# Patient Record
Sex: FEMALE | Race: WHITE | NOT HISPANIC OR LATINO | Employment: FULL TIME | URBAN - METROPOLITAN AREA
[De-identification: names, ages, dates, MRNs, and addresses within clinical notes are randomized per-mention and may not be internally consistent; named-entity substitution may affect disease eponyms.]

---

## 2018-10-17 ENCOUNTER — APPOINTMENT (OUTPATIENT)
Dept: CT IMAGING | Facility: HOSPITAL | Age: 48
End: 2018-10-17

## 2018-10-17 ENCOUNTER — APPOINTMENT (OUTPATIENT)
Dept: GENERAL RADIOLOGY | Facility: HOSPITAL | Age: 48
End: 2018-10-17

## 2018-10-17 ENCOUNTER — HOSPITAL ENCOUNTER (EMERGENCY)
Facility: HOSPITAL | Age: 48
Discharge: HOME | End: 2018-10-17
Attending: EMERGENCY MEDICINE | Admitting: EMERGENCY MEDICINE

## 2018-10-17 VITALS
SYSTOLIC BLOOD PRESSURE: 139 MMHG | HEART RATE: 96 BPM | HEIGHT: 62 IN | DIASTOLIC BLOOD PRESSURE: 84 MMHG | TEMPERATURE: 97.9 F | OXYGEN SATURATION: 99 % | BODY MASS INDEX: 33.73 KG/M2 | RESPIRATION RATE: 16 BRPM | WEIGHT: 183.3 LBS

## 2018-10-17 DIAGNOSIS — R55 VASOVAGAL EPISODE: Primary | ICD-10-CM

## 2018-10-17 LAB
ALBUMIN SERPL-MCNC: 4.2 G/DL (ref 3.5–5.2)
ALBUMIN/GLOB SERPL: 1.4 G/DL
ALP SERPL-CCNC: 85 U/L (ref 39–117)
ALT SERPL W P-5'-P-CCNC: 21 U/L (ref 1–33)
ANION GAP SERPL CALCULATED.3IONS-SCNC: 12.2 MMOL/L
AST SERPL-CCNC: 20 U/L (ref 1–32)
BACTERIA UR QL AUTO: NORMAL /HPF
BASOPHILS # BLD AUTO: 0.03 10*3/MM3 (ref 0–0.2)
BASOPHILS NFR BLD AUTO: 0.3 % (ref 0–1.5)
BILIRUB SERPL-MCNC: 0.7 MG/DL (ref 0.1–1.2)
BILIRUB UR QL STRIP: NEGATIVE
BUN BLD-MCNC: 13 MG/DL (ref 6–20)
BUN/CREAT SERPL: 14.3 (ref 7–25)
CALCIUM SPEC-SCNC: 8.9 MG/DL (ref 8.6–10.5)
CHLORIDE SERPL-SCNC: 101 MMOL/L (ref 98–107)
CLARITY UR: CLEAR
CO2 SERPL-SCNC: 23.8 MMOL/L (ref 22–29)
COLOR UR: YELLOW
CREAT BLD-MCNC: 0.91 MG/DL (ref 0.57–1)
DEPRECATED RDW RBC AUTO: 44.8 FL (ref 37–54)
EOSINOPHIL # BLD AUTO: 0.12 10*3/MM3 (ref 0–0.7)
EOSINOPHIL NFR BLD AUTO: 1.3 % (ref 0.3–6.2)
ERYTHROCYTE [DISTWIDTH] IN BLOOD BY AUTOMATED COUNT: 13.6 % (ref 11.7–13)
GFR SERPL CREATININE-BSD FRML MDRD: 66 ML/MIN/1.73
GLOBULIN UR ELPH-MCNC: 2.9 GM/DL
GLUCOSE BLD-MCNC: 94 MG/DL (ref 65–99)
GLUCOSE UR STRIP-MCNC: NEGATIVE MG/DL
HCG SERPL QL: NEGATIVE
HCT VFR BLD AUTO: 43 % (ref 35.6–45.5)
HGB BLD-MCNC: 14 G/DL (ref 11.9–15.5)
HGB UR QL STRIP.AUTO: NEGATIVE
HOLD SPECIMEN: NORMAL
HOLD SPECIMEN: NORMAL
HYALINE CASTS UR QL AUTO: NORMAL /LPF
IMM GRANULOCYTES # BLD: 0.14 10*3/MM3 (ref 0–0.03)
IMM GRANULOCYTES NFR BLD: 1.5 % (ref 0–0.5)
KETONES UR QL STRIP: NEGATIVE
LEUKOCYTE ESTERASE UR QL STRIP.AUTO: ABNORMAL
LYMPHOCYTES # BLD AUTO: 2.7 10*3/MM3 (ref 0.9–4.8)
LYMPHOCYTES NFR BLD AUTO: 28.2 % (ref 19.6–45.3)
MCH RBC QN AUTO: 29.3 PG (ref 26.9–32)
MCHC RBC AUTO-ENTMCNC: 32.6 G/DL (ref 32.4–36.3)
MCV RBC AUTO: 90 FL (ref 80.5–98.2)
MONOCYTES # BLD AUTO: 0.49 10*3/MM3 (ref 0.2–1.2)
MONOCYTES NFR BLD AUTO: 5.1 % (ref 5–12)
NEUTROPHILS # BLD AUTO: 6.25 10*3/MM3 (ref 1.9–8.1)
NEUTROPHILS NFR BLD AUTO: 65.1 % (ref 42.7–76)
NITRITE UR QL STRIP: NEGATIVE
NRBC BLD MANUAL-RTO: 0 /100 WBC (ref 0–0)
PH UR STRIP.AUTO: 6 [PH] (ref 5–8)
PLATELET # BLD AUTO: 318 10*3/MM3 (ref 140–500)
PMV BLD AUTO: 9.6 FL (ref 6–12)
POTASSIUM BLD-SCNC: 4.1 MMOL/L (ref 3.5–5.2)
PROT SERPL-MCNC: 7.1 G/DL (ref 6–8.5)
PROT UR QL STRIP: NEGATIVE
RBC # BLD AUTO: 4.78 10*6/MM3 (ref 3.9–5.2)
RBC # UR: NORMAL /HPF
REF LAB TEST METHOD: NORMAL
SODIUM BLD-SCNC: 137 MMOL/L (ref 136–145)
SP GR UR STRIP: 1.01 (ref 1–1.03)
SQUAMOUS #/AREA URNS HPF: NORMAL /HPF
TROPONIN T SERPL-MCNC: <0.01 NG/ML (ref 0–0.03)
UROBILINOGEN UR QL STRIP: ABNORMAL
WBC NRBC COR # BLD: 9.59 10*3/MM3 (ref 4.5–10.7)
WBC UR QL AUTO: NORMAL /HPF
WHOLE BLOOD HOLD SPECIMEN: NORMAL
WHOLE BLOOD HOLD SPECIMEN: NORMAL

## 2018-10-17 PROCEDURE — 80053 COMPREHEN METABOLIC PANEL: CPT | Performed by: PHYSICIAN ASSISTANT

## 2018-10-17 PROCEDURE — 84703 CHORIONIC GONADOTROPIN ASSAY: CPT | Performed by: PHYSICIAN ASSISTANT

## 2018-10-17 PROCEDURE — 72110 X-RAY EXAM L-2 SPINE 4/>VWS: CPT

## 2018-10-17 PROCEDURE — 93010 ELECTROCARDIOGRAM REPORT: CPT | Performed by: INTERNAL MEDICINE

## 2018-10-17 PROCEDURE — 85025 COMPLETE CBC W/AUTO DIFF WBC: CPT | Performed by: PHYSICIAN ASSISTANT

## 2018-10-17 PROCEDURE — 81001 URINALYSIS AUTO W/SCOPE: CPT | Performed by: PHYSICIAN ASSISTANT

## 2018-10-17 PROCEDURE — 84484 ASSAY OF TROPONIN QUANT: CPT | Performed by: PHYSICIAN ASSISTANT

## 2018-10-17 PROCEDURE — P9612 CATHETERIZE FOR URINE SPEC: HCPCS

## 2018-10-17 PROCEDURE — 70450 CT HEAD/BRAIN W/O DYE: CPT

## 2018-10-17 PROCEDURE — 99284 EMERGENCY DEPT VISIT MOD MDM: CPT

## 2018-10-17 PROCEDURE — 93005 ELECTROCARDIOGRAM TRACING: CPT | Performed by: PHYSICIAN ASSISTANT

## 2018-10-17 RX ORDER — SODIUM CHLORIDE 0.9 % (FLUSH) 0.9 %
10 SYRINGE (ML) INJECTION AS NEEDED
Status: DISCONTINUED | OUTPATIENT
Start: 2018-10-17 | End: 2018-10-17 | Stop reason: HOSPADM

## 2018-10-17 NOTE — ED TRIAGE NOTES
Pt states has been stressed out at work recently, pt states today she was walking down [address] at work and became nauseous and had witnessed syncopal episode lasting few seconds. pt c/o headache and low back pain.  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

## 2018-10-17 NOTE — ED PROVIDER NOTES
" EMERGENCY DEPARTMENT ENCOUNTER    CHIEF COMPLAINT  Chief Complaint: Near syncope  History given by: Pt  History limited by: Nothing  Room Number:   PMD: Ryle, Larry Madison, MD      HPI:  Pt is a 48 y.o. female who presents complaining of near syncopal episode while she was at work, when the pt was aware but felt slower than usual. 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 The pt confirms headache, vision changes. The pt states that she has had a sinus infection for 1 month and is under a lot of stress at work.     The pt will have surgery on her R ear in 2 days due to tumor/mass in her ear.    Duration:  PTA  Onset: Gradual  Timing: Constant  Location: Head  Quality: Dizziness, \"feeling like she is swimming\"  Intensity/Severity: Moderate  Progression: Improving slightly  Associated Symptoms: Headache, dizziness, vision changes  Aggravating Factors: Unknown  Alleviating Factors: Unknown  Treatment before arrival: None    PAST MEDICAL HISTORY  Active Ambulatory Problems     Diagnosis Date Noted   • No Active Ambulatory Problems     Resolved Ambulatory Problems     Diagnosis Date Noted   • No Resolved Ambulatory Problems     Past Medical History:   Diagnosis Date   • Hypothyroid        PAST SURGICAL HISTORY  Past Surgical History:   Procedure Laterality Date   • BREAST AUGMENTATION     •  SECTION     • CHOLECYSTECTOMY     • DILATATION AND CURETTAGE         FAMILY HISTORY  History reviewed. No pertinent family history.    SOCIAL HISTORY  Social History     Social History   • Marital status:      Spouse name: N/A   • Number of children: N/A   • Years of education: N/A     Occupational History   • Not on file.     Social History Main Topics   • Smoking status: Never Smoker   • Smokeless tobacco: Not on file   • Alcohol use No   • Drug use: No   • Sexual activity: Not on file     Other Topics Concern   • Not on file     Social History " Narrative   • No narrative on file       ALLERGIES  Gadolinium derivatives    REVIEW OF SYSTEMS  Review of Systems   Constitutional: Negative for fever.   HENT: Negative for sore throat.    Eyes: Positive for visual disturbance.   Respiratory: Negative for cough and shortness of breath.    Cardiovascular: Negative for chest pain.   Gastrointestinal: Negative for abdominal pain, diarrhea and vomiting.   Genitourinary: Negative for dysuria.   Musculoskeletal: Negative for neck pain.   Skin: Negative for rash.   Allergic/Immunologic: Negative.    Neurological: Positive for dizziness, syncope (Near) and headaches. Negative for weakness and numbness.   Hematological: Negative.    Psychiatric/Behavioral: Negative.    All other systems reviewed and are negative.      PHYSICAL EXAM  ED Triage Vitals   Temp Heart Rate Resp BP SpO2   10/17/18 1310 10/17/18 1309 10/17/18 1309 10/17/18 1309 10/17/18 1309   97.9 °F (36.6 °C) 109 18 130/93 99 %      Temp src Heart Rate Source Patient Position BP Location FiO2 (%)   10/17/18 1310 10/17/18 1549 10/17/18 1549 10/17/18 1549 --   Tympanic Monitor Lying Right arm        Physical Exam   Constitutional: She is oriented to person, place, and time. No distress.   HENT:   Head: Normocephalic and atraumatic.   Eyes: Pupils are equal, round, and reactive to light. EOM are normal.   Neck: Normal range of motion. Neck supple.   Cardiovascular: Normal rate, regular rhythm and normal heart sounds.    Pulmonary/Chest: Effort normal and breath sounds normal. No respiratory distress.   Abdominal: Soft. There is no tenderness. There is no rebound and no guarding.   Musculoskeletal: Normal range of motion. She exhibits no edema.   Neurological: She is alert and oriented to person, place, and time. She has normal sensation and normal strength.   Skin: Skin is warm and dry. No rash noted.   Psychiatric: Mood and affect normal.   Nursing note and vitals reviewed.      LAB RESULTS  Lab Results (last 24  hours)     Procedure Component Value Units Date/Time    Comprehensive Metabolic Panel [[id number]] Collected:  10/17/18 1329    Specimen:  Blood Updated:  10/17/18 1547     Glucose 94 mg/dL      BUN 13 mg/dL      Creatinine 0.91 mg/dL      Sodium 137 mmol/L      Potassium 4.1 mmol/L      Chloride 101 mmol/L      CO2 23.8 mmol/L      Calcium 8.9 mg/dL      Total Protein 7.1 g/dL      Albumin 4.20 g/dL      ALT (SGPT) 21 U/L      AST (SGOT) 20 U/L      Alkaline Phosphatase 85 U/L      Total Bilirubin 0.7 mg/dL      eGFR Non African Amer 66 mL/min/1.73      Globulin 2.9 gm/dL      A/G Ratio 1.4 g/dL      BUN/Creatinine Ratio 14.3     Anion Gap 12.2 mmol/L     hCG, Serum, Qualitative [[id number]]  (Normal) Collected:  10/17/18 1329    Specimen:  Blood Updated:  10/17/18 1549     HCG Qualitative Negative    Troponin [[id number]]  (Normal) Collected:  10/17/18 1329    Specimen:  Blood Updated:  10/17/18 1547     Troponin T <0.010 ng/mL     Narrative:       Troponin T Reference Ranges:  Less than 0.03 ng/mL:    Negative for AMI  0.03 to 0.09 ng/mL:      Indeterminant for AMI  Greater than 0.09 ng/mL: Positive for AMI    CBC Auto Differential [[id number]]  (Abnormal) Collected:  10/17/18 1329    Specimen:  Blood Updated:  10/17/18 1547     WBC 9.59 10*3/mm3      RBC 4.78 10*6/mm3      Hemoglobin 14.0 g/dL      Hematocrit 43.0 %      MCV 90.0 fL      MCH 29.3 pg      MCHC 32.6 g/dL      RDW 13.6 (H) %      RDW-SD 44.8 fl      MPV 9.6 fL      Platelets 318 10*3/mm3      Neutrophil % 65.1 %      Lymphocyte % 28.2 %      Monocyte % 5.1 %      Eosinophil % 1.3 %      Basophil % 0.3 %      Immature Grans % 1.5 (H) %      Neutrophils, Absolute 6.25 10*3/mm3      Lymphocytes, Absolute 2.70 10*3/mm3      Monocytes, Absolute 0.49 10*3/mm3      Eosinophils, Absolute 0.12 10*3/mm3      Basophils, Absolute 0.03 10*3/mm3      Immature Grans, Absolute 0.14 (H) 10*3/mm3      nRBC 0.0 /100 WBC           I ordered the above labs and reviewed the  results    RADIOLOGY  XR Spine Lumbar 4+ View   Final Result   There is normal alignment. All of the disc spaces and vertebral bodies  are normal in height. There is no evidence of recent or old fracture or  subluxation.     This report was finalized on 10/17/2018 5:13 PM by Dr. Prakash Phelan M.D.   CT Head Without Contrast    (Results Pending)   CT head: negative     I ordered the above noted radiological studies. Interpreted by radiologist. Discussed with radiologist (Dr. Samaniego). Reviewed by me in PACS.       PROCEDURES  Procedures  EKG          EKG time: 1335  Rhythm/Rate: Sinus, 92  P waves and MA: Normal  QRS, axis: Normal   ST and T waves: Normal     Interpreted Contemporaneously by me, independently viewed  No prior for comparison      PROGRESS AND CONSULTS  ED Course as of Oct 17 1723   Wed Oct 17, 2018   1330 Near syncopal episode at work  [EE]      ED Course User Index  [EE] Regis Alegre, PA   1616 Ordered CT head due to syncope and pt h/o tumor in her R ear.     1718 Discussed that episodes that the pt is describing is often caused by a drop in BP, which the pt states her family members have had problems with. Discussed the results of the pt's CT head which was negative, and the pt's unremarkable lab results. Discussed that stress as well as the tumor or damage in her ear could cause her symptoms as well. Discussed the plan to discharge the pt, and that she should keep her appointment and surgery with ENT in 2 days. The pt agrees with the plan and all questions were addressed.       MEDICAL DECISION MAKING  Results were reviewed/discussed with the patient and they were also made aware of online access. Pt also made aware that some labs, such as cultures, will not be resulted during ER visit and follow up with PMD is necessary.     MDM  Number of Diagnoses or Management Options     Amount and/or Complexity of Data Reviewed  Clinical lab tests: ordered and reviewed (Troponin: Negative)  Tests in the  radiology section of CPT®: ordered and reviewed (XR L spine: Nothing acute  CT head: Negative)  Tests in the medicine section of CPT®: ordered and reviewed (See EKG)  Discussion of test results with the performing providers: yes (Dr. Samaniego (radiology))  Decide to obtain previous medical records or to obtain history from someone other than the patient: yes  Review and summarize past medical records: yes    Patient Progress  Patient progress: improved         DIAGNOSIS  Final diagnoses:   Vasovagal episode       DISPOSITION  Disposition: Discharged.    Patient discharged in stable condition.    Reviewed implications of results, diagnosis, meds, responsibility to follow up, warning signs and symptoms of possible worsening, potential complications and reasons to return to ER, including new or worsening symptoms.    Patient/Family voiced understanding of above instructions.    Discussed plan for discharge, as there is no emergent indication for admission.  Pt/family is agreeable and understands need for follow up and repeat testing.  Pt is aware that discharge does not mean that nothing is wrong but it indicates no emergency is present and they must continue care with follow-up as given below or physician of their choice.     FOLLOW-UP  Ryle, Larry Madison, MD  [address] [place] [address]   [place] [id number] [phone]    Call            Medication List      No changes were made to your prescriptions during this visit.         Latest Documented Vital Signs:  As of 5:23 PM  BP- 131/83 HR- 83 Temp- 97.9 °F (36.6 °C) (Tympanic) O2 sat- 100%    --  Documentation assistance provided by gaudencio Davis for Dr. Loomis.  Information recorded by the scribe was done at my direction and has been verified and validated by me.     Minda Davis  10/17/18 [id number]       Jermaine Loomis MD  10/21/18 2912

## 2018-10-17 NOTE — ED TRIAGE NOTES
EMS reports pt has a tumor in her right ear that she is supposed to have surgery on it on Friday. Pt reports today she was walking down the [place] and became dizzy.  Pt reports she has a headache. Pt reports she passed out while walking down the [place]. Pt is alert and oriented x 3. Pt's speech is normal, no facial droop at this time.